# Patient Record
Sex: MALE | Race: WHITE
[De-identification: names, ages, dates, MRNs, and addresses within clinical notes are randomized per-mention and may not be internally consistent; named-entity substitution may affect disease eponyms.]

---

## 2018-04-04 ENCOUNTER — HOSPITAL ENCOUNTER (EMERGENCY)
Dept: HOSPITAL 45 - C.EDB | Age: 63
Discharge: HOME | End: 2018-04-04
Payer: COMMERCIAL

## 2018-04-04 VITALS
WEIGHT: 191.14 LBS | BODY MASS INDEX: 30 KG/M2 | HEIGHT: 67.01 IN | WEIGHT: 191.14 LBS | BODY MASS INDEX: 30 KG/M2 | HEIGHT: 67.01 IN

## 2018-04-04 VITALS — OXYGEN SATURATION: 100 % | HEART RATE: 76 BPM

## 2018-04-04 VITALS — TEMPERATURE: 98.24 F

## 2018-04-04 VITALS — DIASTOLIC BLOOD PRESSURE: 87 MMHG | SYSTOLIC BLOOD PRESSURE: 169 MMHG

## 2018-04-04 DIAGNOSIS — Z85.118: ICD-10-CM

## 2018-04-04 DIAGNOSIS — Z98.890: ICD-10-CM

## 2018-04-04 DIAGNOSIS — Z83.3: ICD-10-CM

## 2018-04-04 DIAGNOSIS — Z88.8: ICD-10-CM

## 2018-04-04 DIAGNOSIS — K29.70: Primary | ICD-10-CM

## 2018-04-04 DIAGNOSIS — I10: ICD-10-CM

## 2018-04-04 DIAGNOSIS — E78.5: ICD-10-CM

## 2018-04-04 DIAGNOSIS — I73.9: ICD-10-CM

## 2018-04-04 DIAGNOSIS — K21.9: ICD-10-CM

## 2018-04-04 DIAGNOSIS — Z87.891: ICD-10-CM

## 2018-04-04 DIAGNOSIS — Z79.82: ICD-10-CM

## 2018-04-04 DIAGNOSIS — Z79.899: ICD-10-CM

## 2018-04-04 DIAGNOSIS — Z79.02: ICD-10-CM

## 2018-04-04 LAB
ALBUMIN SERPL-MCNC: 3.9 GM/DL (ref 3.4–5)
ALP SERPL-CCNC: 81 U/L (ref 45–117)
ALT SERPL-CCNC: 30 U/L (ref 12–78)
AST SERPL-CCNC: 18 U/L (ref 15–37)
BASOPHILS # BLD: 0.03 K/UL (ref 0–0.2)
BASOPHILS NFR BLD: 0.3 %
BUN SERPL-MCNC: 9 MG/DL (ref 7–18)
CALCIUM SERPL-MCNC: 8.8 MG/DL (ref 8.5–10.1)
CO2 SERPL-SCNC: 26 MMOL/L (ref 21–32)
CREAT SERPL-MCNC: 0.84 MG/DL (ref 0.6–1.4)
EOS ABS #: 0.14 K/UL (ref 0–0.5)
EOSINOPHIL NFR BLD AUTO: 239 K/UL (ref 130–400)
GLUCOSE SERPL-MCNC: 105 MG/DL (ref 70–99)
HCT VFR BLD CALC: 41.3 % (ref 42–52)
HGB BLD-MCNC: 14.1 G/DL (ref 14–18)
IG#: 0.02 K/UL (ref 0–0.02)
IMM GRANULOCYTES NFR BLD AUTO: 18.1 %
LIPASE: 202 U/L (ref 73–393)
LYMPHOCYTES # BLD: 1.91 K/UL (ref 1.2–3.4)
MCH RBC QN AUTO: 31.1 PG (ref 25–34)
MCHC RBC AUTO-ENTMCNC: 34.1 G/DL (ref 32–36)
MCV RBC AUTO: 91 FL (ref 80–100)
MONO ABS #: 1.06 K/UL (ref 0.11–0.59)
MONOCYTES NFR BLD: 10 %
NEUT ABS #: 7.39 K/UL (ref 1.4–6.5)
NEUTROPHILS # BLD AUTO: 1.3 %
NEUTROPHILS NFR BLD AUTO: 70.1 %
PMV BLD AUTO: 9.5 FL (ref 7.4–10.4)
POTASSIUM SERPL-SCNC: 3.7 MMOL/L (ref 3.5–5.1)
PROT SERPL-MCNC: 7.3 GM/DL (ref 6.4–8.2)
RED CELL DISTRIBUTION WIDTH CV: 12.6 % (ref 11.5–14.5)
RED CELL DISTRIBUTION WIDTH SD: 41.9 FL (ref 36.4–46.3)
SODIUM SERPL-SCNC: 138 MMOL/L (ref 136–145)
WBC # BLD AUTO: 10.55 K/UL (ref 4.8–10.8)

## 2018-04-04 NOTE — EMERGENCY ROOM VISIT NOTE
History


Report prepared by Apolinar:  Venancio Silveira


Under the Supervision of:  Dr. Armond Darby M.D.


First contact with patient:  17:28


Chief Complaint:  ABDOMINAL PAIN


Stated Complaint:  ABDOMINAL PAIN





History of Present Illness


The patient is a 62 year old  male with a past medical history of 

hyperlipidemia, hypertension, acid reflux, and PAD who presents to the ED with 

a cc of intermittent, dull, abdominal pain beginning last night. Negative for 

urinary symptoms, nausea, and vomiting. The patient states that he woke up this 

morning and had a bowel movement. He notes that his pain feels like a "pressure 

from gas." He reports that he took three Gaviscon with no relief of his 

symptoms as well as a Tylenol with mild relief. The patient states that he also 

takes daily Plavix, aspirin, and medications for his acid reflux and 

hypertension. He notes that he also has a stent placed in his leg, resolved 

atrial fibrillation, and has had lung surgery in the past. He reports that has 

not traveled recently and has not experienced any recent trauma.





   Source of History:  patient


   Onset:  last night


   Position:  abdomen


   Quality:  dull, other ("pressure from gas")


   Timing:  intermittent


   Modifying Factors (Relieving):  other (tylenol)


   Associated Symptoms:  No nausea, No vomiting, No urinary symptoms





Review of Systems


See HPI for pertinent positives and negatives.  A total of ten systems were 

reviewed and were otherwise negative.





Past Medical & Surgical


Medical Problems:


(1) Acid reflux


(2) Afib


(3) BPH (benign prostatic hyperplasia)


(4) GERD (gastroesophageal reflux disease)


(5) Hyperlipidemia


(6) Hypertension


(7) Lung cancer, upper lobe


(8) PAD (peripheral artery disease)


(9) Stenosis of lower extremity artery








Family History





Cancer


Diabetes mellitus





Social History


Smoking Status:  Former Smoker


Alcohol Use:  none


Marital Status:  


Housing Status:  lives with family


Occupation Status:  retired





Current/Historical Medications


Scheduled


Aspirin Enteric Coated (Ecotrin Or Generic *), 81 MG PO QAM


Atorvastatin (Lipitor), 80 MG PO HS


Clopidogrel (Plavix), 75 MG PO QAM


Lisinopril (Prinivil), 5 MG PO DAILY


Pantoprazole (Protonix), 40 MG PO BID


Ranitidine (Zantac), 300 MG PO HS





Allergies


Coded Allergies:  


     Famotidine (Verified  Allergy, Intermediate, RASH, 4/4/18)


     Cilostazol (Verified  Adverse Reaction, Mild, pt unaware of this allergy- 

per records causes headaches , 4/4/18)





Physical Exam


Vital Signs











  Date Time  Temp Pulse Resp B/P (MAP) Pulse Ox O2 Delivery O2 Flow Rate FiO2


 


4/4/18 19:11    146/92    


 


4/4/18 18:49  68 17  98 Room Air  


 


4/4/18 18:34  60 22  99   


 


4/4/18 18:19  68 12  97   


 


4/4/18 18:13  63      


 


4/4/18 18:09    173/95    


 


4/4/18 17:24 36.8 83 17 159/97 97 Room Air  











Physical Exam


GENERAL: Awake, alert, well-appearing, NAD


HENT: Normocephalic, atraumatic.


EYES: Normal conjunctiva. Sclera non-icteric.


NECK: Supple. No nuchal rigidity. FROM.


RESPIRATORY: CTAB, no rhonchi, wheezing, crackles


CARDIAC: RRR, no MRG


ABDOMEN: Soft, NTND, BS+, negative obturators, negative psoas, negative Crocker'

s.


MSK: No chest wall TTP, no LE edema, no CVA TTP.


NEURO: GCS 15, CN 2-12 intact, moves all 4s on command


SKIN: No rash or jaundice noted.





Medical Decision & Procedures


ER Provider


Diagnostic Interpretation:


Radiology results as stated below per my review and radiologist interpretation: 





KUB





FINDINGS: 2 AP supine abdominal radiographs are correlated with abdominal CT


dated 2/12/2007. There is a nonobstructed abdominal bowel gas pattern. Mild


colonic fecal retention is noted. No evidence of intraperitoneal free air is


seen on these supine views. There are no abnormal abdominal calcifications. The


skeletal structures are osteopenic. Lumbosacral spondylosis is observed.





IMPRESSION: Nonobstructed abdominal bowel gas pattern.





Electronically signed by:  Alfonzo Lake M.D.


4/4/2018 7:24 PM





Laboratory Results


4/4/18 18:04








Red Blood Count 4.54, Mean Corpuscular Volume 91.0, Mean Corpuscular Hemoglobin 

31.1, Mean Corpuscular Hemoglobin Concent 34.1, Mean Platelet Volume 9.5, 

Neutrophils (%) (Auto) 70.1, Lymphocytes (%) (Auto) 18.1, Monocytes (%) (Auto) 

10.0, Eosinophils (%) (Auto) 1.3, Basophils (%) (Auto) 0.3, Neutrophils # (Auto

) 7.39, Lymphocytes # (Auto) 1.91, Monocytes # (Auto) 1.06, Eosinophils # (Auto

) 0.14, Basophils # (Auto) 0.03





4/4/18 18:04

















Test


  4/4/18


18:04 4/4/18


18:05 4/4/18


18:07


 


White Blood Count


  10.55 K/uL


(4.8-10.8) 


  


 


 


Red Blood Count


  4.54 M/uL


(4.7-6.1) 


  


 


 


Hemoglobin


  14.1 g/dL


(14.0-18.0) 


  


 


 


Hematocrit 41.3 % (42-52)   


 


Mean Corpuscular Volume


  91.0 fL


() 


  


 


 


Mean Corpuscular Hemoglobin


  31.1 pg


(25-34) 


  


 


 


Mean Corpuscular Hemoglobin


Concent 34.1 g/dl


(32-36) 


  


 


 


Platelet Count


  239 K/uL


(130-400) 


  


 


 


Mean Platelet Volume


  9.5 fL


(7.4-10.4) 


  


 


 


Neutrophils (%) (Auto) 70.1 %   


 


Lymphocytes (%) (Auto) 18.1 %   


 


Monocytes (%) (Auto) 10.0 %   


 


Eosinophils (%) (Auto) 1.3 %   


 


Basophils (%) (Auto) 0.3 %   


 


Neutrophils # (Auto)


  7.39 K/uL


(1.4-6.5) 


  


 


 


Lymphocytes # (Auto)


  1.91 K/uL


(1.2-3.4) 


  


 


 


Monocytes # (Auto)


  1.06 K/uL


(0.11-0.59) 


  


 


 


Eosinophils # (Auto)


  0.14 K/uL


(0-0.5) 


  


 


 


Basophils # (Auto)


  0.03 K/uL


(0-0.2) 


  


 


 


RDW Standard Deviation


  41.9 fL


(36.4-46.3) 


  


 


 


RDW Coefficient of Variation


  12.6 %


(11.5-14.5) 


  


 


 


Immature Granulocyte % (Auto) 0.2 %   


 


Immature Granulocyte # (Auto)


  0.02 K/uL


(0.00-0.02) 


  


 


 


Anion Gap


  6.0 mmol/L


(3-11) 


  


 


 


Est Creatinine Clear Calc


Drug Dose 93.4 ml/min 


  


  


 


 


Estimated GFR (


American) 107.2 


  


  


 


 


Estimated GFR (Non-


American 92.5 


  


  


 


 


BUN/Creatinine Ratio 11.1 (10-20)   


 


Calcium Level


  8.8 mg/dl


(8.5-10.1) 


  


 


 


Total Bilirubin


  0.6 mg/dl


(0.2-1) 


  


 


 


Direct Bilirubin


  0.1 mg/dl


(0-0.2) 


  


 


 


Aspartate Amino Transf


(AST/SGOT) 18 U/L (15-37) 


  


  


 


 


Alanine Aminotransferase


(ALT/SGPT) 30 U/L (12-78) 


  


  


 


 


Alkaline Phosphatase


  81 U/L


() 


  


 


 


Total Protein


  7.3 gm/dl


(6.4-8.2) 


  


 


 


Albumin


  3.9 gm/dl


(3.4-5.0) 


  


 


 


Lipase


  202 U/L


() 


  


 


 


Urine Color  YELLOW  


 


Urine Appearance  CLEAR (CLEAR)  


 


Urine pH  6.5 (4.5-7.5)  


 


Urine Specific Gravity


  


  1.005


(1.000-1.030) 


 


 


Urine Protein  NEG (NEG)  


 


Urine Glucose (UA)  NEG (NEG)  


 


Urine Ketones  NEG (NEG)  


 


Urine Occult Blood  NEG (NEG)  


 


Urine Nitrite  NEG (NEG)  


 


Urine Bilirubin  NEG (NEG)  


 


Urine Urobilinogen  NEG (NEG)  


 


Urine Leukocyte Esterase  NEG (NEG)  


 


Bedside Troponin I


  


  


  < 0.030 ng/ml


(0-0.045)





Laboratory results reviewed by me





Medications Administered











 Medications


  (Trade)  Dose


 Ordered  Sig/Maneul


 Route  Start Time


 Stop Time Status Last Admin


Dose Admin


 


 Ondansetron HCl


  (Zofran Inj)  4 mg  NOW  STAT


 IV  4/4/18 17:47


 4/4/18 17:49 DC 4/4/18 18:10


4 MG


 


 Al Hydroxide/Mg


 Hydroxide


  (Maalox Susp)  30 ml  STK-MED ONCE


 .ROUTE  4/4/18 17:51


 4/4/18 17:52 DC 4/4/18 18:10


30 ML


 


 Lidocaine HCl


  (Viscous


 Lidocaine 2% Soln)  20 ml  STK-MED ONCE


 .ROUTE  4/4/18 17:51


 4/4/18 17:52 DC 4/4/18 18:11


20 ML











ECG Per My Interpretation


Indication:  abdominal pain


Rate (beats per minute):  60


Rhythm:  normal sinus


Findings:  T-wave inversion (lead 3), other (Normal intervals, normal axis, AVF 

present)


Comparison ECG Date:  12/12/2016


Change:


T wave inversions are old compared to prior.





ED Course


1731: The patient was evaluated in room B11. A complete history and physical 

exam was performed.





2136: I reevaluated the patient. Discussed results and discharge instructions: 

He verbalized understanding and agreement. The patient is ready for discharge.





Medical Decision


Nursing notes reviewed. Ancillary studies and prior records reviewed. 





The patient is a 62 year old  male with a past medical history of 

hyperlipidemia, hypertension, acid reflux, and PAD on ASA and Plavix who 

presents to the ED with a cc of intermittent, dull, abdominal pain beginning 

last night. Negative for urinary symptoms, nausea, and vomiting. 





Differential diagnosis:


Etiologies such as appendicitis, diverticulitis, PUD, biliary pathology, UTI, 

pancreatitis, obstruction, mesenteric ischemia, aortic pathology, infections, 

inflammatory bowel disease, renal colic, as well as others were entertained. 





Patient was seen and evaluated at the bedside.  Patient was complaining of some 

mild abdominal discomfort.  He described it as intermittent and in the upper 

abdomen.  Patient did try drinking some soda water and tried some Gaviscon 

without much relief.  The patient denies any nausea vomiting.  Patient did have 

recent bowel movement this morning.  Patient does have a prior history of PAD 

status post stent placement and is on aspirin and Plavix.  Patient denies any 

chest pains or shortness of breath.





Patient did have blood work completed along with an EKG, troponin, and plain 

films.  Patient was also given medications for symptom and a control.  Patient'

s EKG did show TW I in the inferior leads but these appear old and are not 

acute.  Troponin negative.  Less likely ACS or atypical chest pains.  His other 

blood work is fairly unremarkable.  Patient does have a KUB with a 

nonobstructive bowel gas pattern.





On reassessment patient was feeling improved.  Patient was told of his results 

and was told to continue over-the-counter treatment to follow-up with his 

primary care as needed.  I do not believe he requires further imaging as the 

patient is well-appearing and feeling well.  Patient was given strict follow-up

, discharge, and return precautions.  All questions were answered.  Patient was 

deemed suitable for outpatient follow-up at this time.  Patient agreed with the 

plan of care and was safely discharged home.





Medication Reconcilliation


Current Medication List:  was personally reviewed by me





Blood Pressure Screening


Patient's blood pressure:  Elevated blood pressure


Blood pressure disposition:  Referred to PCP





Impression





 Primary Impression:  


 Gastritis





Scribe Attestation


The scribe's documentation has been prepared under my direction and personally 

reviewed by me in its entirety. I confirm that the note above accurately 

reflects all work, treatment, procedures, and medical decision making performed 

by me.





Departure Information


Dispostion


Home / Self-Care





Referrals


Melvin Echeverria M.D. (PCP)





Forms


Call Back Authorization, HOME CARE DOCUMENTATION FORM,                         

                                       IMPORTANT VISIT INFORMATION





Patient Instructions


ED Gastritis, My Bimal Angulo [facility]





Additional Instructions





Please return to the emergency department if you have worsening or recurrent 

symptoms not amenable to at-home treatment.  Please call for a follow-up 

appointment with her primary care physician.  Please take your medications as 

prescribed.  If you have other concerns and/or complaints please feel free to 

also call your primary care physician's office or return the ED for further 

evaluation, management, and treatment.





You may take tylenol 1000 mg every 6 hours as needed for pain. tylenol 

separately or at the same time. 





Take your medications as prescribed. 





You have been examined and treated today on an emergency basis only. This is 

not a substitute for, or an effort to provide, complete comprehensive medical 

care. It is impossible to recognize and treat all injuries or illnesses in a 

single emergency department visit. It is therefore important that you follow up 

closely with [facility], your PCP, and/or your specialist(s). 

Call as soon as possible for an appointment.





Thank you for your time and consideration. I look forward to speaking with you 

again soon. Please don't hesitate to call us if you have any questions.





Problem Qualifiers








 Primary Impression:  


 Gastritis


 Gastritis type:  unspecified gastritis  Chronicity:  unspecified  Gastritis 

bleeding:  presence of bleeding unspecified  Qualified Codes:  K29.70 - 

Gastritis, unspecified, without bleeding

## 2018-04-04 NOTE — DIAGNOSTIC IMAGING REPORT
KUB



CLINICAL HISTORY: Generalized abdominal pain.



FINDINGS: 2 AP supine abdominal radiographs are correlated with abdominal CT

dated 2/12/2007. There is a nonobstructed abdominal bowel gas pattern. Mild

colonic fecal retention is noted. No evidence of intraperitoneal free air is

seen on these supine views. There are no abnormal abdominal calcifications. The

skeletal structures are osteopenic. Lumbosacral spondylosis is observed.



IMPRESSION: Nonobstructed abdominal bowel gas pattern.







Electronically signed by:  Alfonzo Lake M.D.

4/4/2018 7:24 PM



Dictated Date/Time:  4/4/2018 7:23 PM

## 2018-04-26 ENCOUNTER — HOSPITAL ENCOUNTER (EMERGENCY)
Dept: HOSPITAL 45 - C.EDB | Age: 63
Discharge: HOME | End: 2018-04-26
Payer: COMMERCIAL

## 2018-04-26 VITALS
BODY MASS INDEX: 27.85 KG/M2 | HEIGHT: 67.01 IN | HEIGHT: 67.01 IN | WEIGHT: 177.47 LBS | WEIGHT: 177.47 LBS | BODY MASS INDEX: 27.85 KG/M2

## 2018-04-26 VITALS — TEMPERATURE: 98.06 F

## 2018-04-26 VITALS — DIASTOLIC BLOOD PRESSURE: 94 MMHG | SYSTOLIC BLOOD PRESSURE: 170 MMHG | HEART RATE: 76 BPM | OXYGEN SATURATION: 96 %

## 2018-04-26 DIAGNOSIS — E78.5: ICD-10-CM

## 2018-04-26 DIAGNOSIS — Z83.3: ICD-10-CM

## 2018-04-26 DIAGNOSIS — Z79.899: ICD-10-CM

## 2018-04-26 DIAGNOSIS — I10: ICD-10-CM

## 2018-04-26 DIAGNOSIS — K21.9: ICD-10-CM

## 2018-04-26 DIAGNOSIS — Z79.82: ICD-10-CM

## 2018-04-26 DIAGNOSIS — Z79.02: ICD-10-CM

## 2018-04-26 DIAGNOSIS — I48.91: ICD-10-CM

## 2018-04-26 DIAGNOSIS — Z88.8: ICD-10-CM

## 2018-04-26 DIAGNOSIS — I73.9: ICD-10-CM

## 2018-04-26 DIAGNOSIS — R19.7: Primary | ICD-10-CM

## 2018-04-26 DIAGNOSIS — Z85.118: ICD-10-CM

## 2018-04-26 DIAGNOSIS — E87.6: ICD-10-CM

## 2018-04-26 LAB
ALBUMIN SERPL-MCNC: 3.4 GM/DL (ref 3.4–5)
ALP SERPL-CCNC: 75 U/L (ref 45–117)
ALT SERPL-CCNC: 47 U/L (ref 12–78)
AST SERPL-CCNC: 28 U/L (ref 15–37)
BASOPHILS # BLD: 0.03 K/UL (ref 0–0.2)
BASOPHILS NFR BLD: 0.5 %
BUN SERPL-MCNC: 7 MG/DL (ref 7–18)
CALCIUM SERPL-MCNC: 8.3 MG/DL (ref 8.5–10.1)
CO2 SERPL-SCNC: 25 MMOL/L (ref 21–32)
CREAT SERPL-MCNC: 0.84 MG/DL (ref 0.6–1.4)
EOS ABS #: 0.06 K/UL (ref 0–0.5)
EOSINOPHIL NFR BLD AUTO: 204 K/UL (ref 130–400)
GLUCOSE SERPL-MCNC: 112 MG/DL (ref 70–99)
HCT VFR BLD CALC: 39.8 % (ref 42–52)
HGB BLD-MCNC: 13.8 G/DL (ref 14–18)
IG#: 0.01 K/UL (ref 0–0.02)
IMM GRANULOCYTES NFR BLD AUTO: 23.9 %
LIPASE: 194 U/L (ref 73–393)
LYMPHOCYTES # BLD: 1.4 K/UL (ref 1.2–3.4)
MCH RBC QN AUTO: 30.7 PG (ref 25–34)
MCHC RBC AUTO-ENTMCNC: 34.7 G/DL (ref 32–36)
MCV RBC AUTO: 88.4 FL (ref 80–100)
MONO ABS #: 0.63 K/UL (ref 0.11–0.59)
MONOCYTES NFR BLD: 10.7 %
NEUT ABS #: 3.74 K/UL (ref 1.4–6.5)
NEUTROPHILS # BLD AUTO: 1 %
NEUTROPHILS NFR BLD AUTO: 63.7 %
PMV BLD AUTO: 9.9 FL (ref 7.4–10.4)
POTASSIUM SERPL-SCNC: 3.3 MMOL/L (ref 3.5–5.1)
PROT SERPL-MCNC: 7.8 GM/DL (ref 6.4–8.2)
RED CELL DISTRIBUTION WIDTH CV: 12.6 % (ref 11.5–14.5)
RED CELL DISTRIBUTION WIDTH SD: 40.4 FL (ref 36.4–46.3)
SODIUM SERPL-SCNC: 136 MMOL/L (ref 136–145)
WBC # BLD AUTO: 5.87 K/UL (ref 4.8–10.8)

## 2018-04-26 NOTE — EMERGENCY ROOM VISIT NOTE
History


Report prepared by Apolinar:  Robbin Haley


Under the Supervision of:  Dr. Navarro Ruiz D.O.


First contact with patient:  14:01


Chief Complaint:  DIARRHEA


Stated Complaint:  COUGH,CHEST CONGESTION,CANT EAT,WEAKNESS





History of Present Illness


The patient is a 62 year old male who presents to the Emergency Room with 

complaints of persistent diarrhea that he has been experiencing for the past 4 

days. The patient states that his symptoms began on the 18th, 8 days ago, when 

he developed a cough that was producing "yellowish mucous." He visited with his 

primary care office who prescribed him Augmentin. He was also give Nystatin 

"Swish and Swallow" for Thrush. Since starting the Augmentin he has developed 

the diarrhea. He also complains that he is nauseous, but is not vomiting. He 

has no appetite to eat. He denies any abdominal pain. The patient did add that 

he had a fever of 100.9 degrees 5 days ago. He denies any recent travel or 

history of C. difficile.





   Source of History:  patient


   Onset:  4 days ago


   Position:  abdomen


   Quality:  other (diarrhea )


   Timing:  other (persistent)


   Associated Symptoms:  + nausea, No vomiting, No abdominal pain





Review of Systems


See HPI for pertinent positives & negatives. A total of 10 systems reviewed and 

were otherwise negative.





Past Medical & Surgical


Medical Problems:


(1) Acid reflux


(2) Afib


(3) BPH (benign prostatic hyperplasia)


(4) GERD (gastroesophageal reflux disease)


(5) Hyperlipidemia


(6) Hypertension


(7) Lung cancer, upper lobe


(8) PAD (peripheral artery disease)


(9) Stenosis of lower extremity artery








Family History





Cancer


Diabetes mellitus





Social History


Smoking Status:  Never Smoker


Alcohol Use:  none


Marital Status:  


Housing Status:  lives with family


Occupation Status:  retired





Current/Historical Medications


Scheduled


Aluminum Hydroxide-Mag Trisil (Gaviscon), 1 TAB PO AS DIRECTED


Aspirin (Aspirin Ec), 81 MG PO DAILY


Atorvastatin (Lipitor), 80 MG PO HS


Clopidogrel (Plavix), 75 MG PO QAM


Lisinopril (Prinivil), 5 MG PO DAILY


Pantoprazole (Protonix), 40 MG PO BID


Ranitidine (Zantac), 300 MG PO HS


Sucralfate (Carafate), 10 ML PO QID





Allergies


Coded Allergies:  


     Famotidine (Verified  Allergy, Intermediate, RASH, 4/26/18)


     Cilostazol (Verified  Adverse Reaction, Mild, pt unaware of this allergy- 

per records causes headaches , 4/26/18)





Physical Exam


Vital Signs











  Date Time  Temp Pulse Resp B/P (MAP) Pulse Ox O2 Delivery O2 Flow Rate FiO2


 


4/26/18 16:25  76 18 170/94 96   


 


4/26/18 15:43  74 18 149/85 97 Room Air  


 


4/26/18 13:47 36.7 95 20 143/86 95 Room Air  











Physical Exam


GENERAL: Sitting up in bed, alert, well appearing, well nourished, no distress, 

non-toxic 


EYE EXAM: normal conjunctiva. 


OROPHARYNX: no exudate, no erythema, lips, buccal mucosa, and tongue normal and 

mucous membranes are moist


NECK: supple, no nuchal rigidity, no adenopathy, non-tender


LUNGS: Clear to auscultation. Normal chest wall mechanics


HEART: no murmurs, S1 normal and S2 normal 


ABDOMEN: abdomen soft, non-tender, normo-active bowel sounds, no masses, no 

rebound or guarding. 


BACK: Back is symmetrical on inspection and there is no deformity, no midline 

tenderness, no CVA tenderness. 


SKIN: no rashes and no bruising 


UPPER EXTREMITIES: upper extremities are grossly normal. 


LOWER EXTREMITIES: No pitting edema. 


NEURO EXAM: Normal sensorium, cranial nerves II-XII grossly intact, normal 

speech,  no gross weakness of arms, no gross weakness of legs.





Medical Decision & Procedures


Laboratory Results


4/26/18 14:27








Red Blood Count 4.50, Mean Corpuscular Volume 88.4, Mean Corpuscular Hemoglobin 

30.7, Mean Corpuscular Hemoglobin Concent 34.7, Mean Platelet Volume 9.9, 

Neutrophils (%) (Auto) 63.7, Lymphocytes (%) (Auto) 23.9, Monocytes (%) (Auto) 

10.7, Eosinophils (%) (Auto) 1.0, Basophils (%) (Auto) 0.5, Neutrophils # (Auto

) 3.74, Lymphocytes # (Auto) 1.40, Monocytes # (Auto) 0.63, Eosinophils # (Auto

) 0.06, Basophils # (Auto) 0.03





4/26/18 14:27

















Test


  4/26/18


14:20 4/26/18


14:27


 


Urine Color YELLOW  


 


Urine Appearance CLEAR (CLEAR)  


 


Urine pH 6.5 (4.5-7.5)  


 


Urine Specific Gravity


  1.009


(1.000-1.030) 


 


 


Urine Protein NEG (NEG)  


 


Urine Glucose (UA) NEG (NEG)  


 


Urine Ketones NEG (NEG)  


 


Urine Occult Blood NEG (NEG)  


 


Urine Nitrite NEG (NEG)  


 


Urine Bilirubin NEG (NEG)  


 


Urine Urobilinogen NEG (NEG)  


 


Urine Leukocyte Esterase NEG (NEG)  


 


Urine WBC (Auto) 1-5 /hpf (0-5)  


 


Urine RBC (Auto) 0-4 /hpf (0-4)  


 


Urine Hyaline Casts (Auto) 0 /lpf (0-5)  


 


Urine Epithelial Cells (Auto) 0-5 /lpf (0-5)  


 


Urine Bacteria (Auto) NEG (NEG)  


 


White Blood Count


  


  5.87 K/uL


(4.8-10.8)


 


Red Blood Count


  


  4.50 M/uL


(4.7-6.1)


 


Hemoglobin


  


  13.8 g/dL


(14.0-18.0)


 


Hematocrit  39.8 % (42-52) 


 


Mean Corpuscular Volume


  


  88.4 fL


()


 


Mean Corpuscular Hemoglobin


  


  30.7 pg


(25-34)


 


Mean Corpuscular Hemoglobin


Concent 


  34.7 g/dl


(32-36)


 


Platelet Count


  


  204 K/uL


(130-400)


 


Mean Platelet Volume


  


  9.9 fL


(7.4-10.4)


 


Neutrophils (%) (Auto)  63.7 % 


 


Lymphocytes (%) (Auto)  23.9 % 


 


Monocytes (%) (Auto)  10.7 % 


 


Eosinophils (%) (Auto)  1.0 % 


 


Basophils (%) (Auto)  0.5 % 


 


Neutrophils # (Auto)


  


  3.74 K/uL


(1.4-6.5)


 


Lymphocytes # (Auto)


  


  1.40 K/uL


(1.2-3.4)


 


Monocytes # (Auto)


  


  0.63 K/uL


(0.11-0.59)


 


Eosinophils # (Auto)


  


  0.06 K/uL


(0-0.5)


 


Basophils # (Auto)


  


  0.03 K/uL


(0-0.2)


 


RDW Standard Deviation


  


  40.4 fL


(36.4-46.3)


 


RDW Coefficient of Variation


  


  12.6 %


(11.5-14.5)


 


Immature Granulocyte % (Auto)  0.2 % 


 


Immature Granulocyte # (Auto)


  


  0.01 K/uL


(0.00-0.02)


 


Anion Gap


  


  7.0 mmol/L


(3-11)


 


Est Creatinine Clear Calc


Drug Dose 


  90.3 ml/min 


 


 


Estimated GFR (


American) 


  107.2 


 


 


Estimated GFR (Non-


American 


  92.5 


 


 


BUN/Creatinine Ratio  7.8 (10-20) 


 


Calcium Level


  


  8.3 mg/dl


(8.5-10.1)


 


Total Bilirubin


  


  0.5 mg/dl


(0.2-1)


 


Direct Bilirubin


  


  0.2 mg/dl


(0-0.2)


 


Aspartate Amino Transf


(AST/SGOT) 


  28 U/L (15-37) 


 


 


Alanine Aminotransferase


(ALT/SGPT) 


  47 U/L (12-78) 


 


 


Alkaline Phosphatase


  


  75 U/L


()


 


Total Protein


  


  7.8 gm/dl


(6.4-8.2)


 


Albumin


  


  3.4 gm/dl


(3.4-5.0)


 


Lipase


  


  194 U/L


()














 Date/Time


Source Procedure


Growth Status


 


 


 4/26/18 14:20


Stool C.difficile Toxin B Gene (PCR) - Final


No C. difficile toxin B gene detected Complete





Laboratory results per my review.





Medications Administered











 Medications


  (Trade)  Dose


 Ordered  Sig/Manuel


 Route  Start Time


 Stop Time Status Last Admin


Dose Admin


 


 Sodium Chloride  1,000 ml @ 


 999 mls/hr  Q1H1M STAT


 IV  4/26/18 14:33


 4/26/18 15:33 DC 4/26/18 14:39


999 MLS/HR


 


 Potassium Chloride


  (Klor-Con M10)  40 meq  STK-MED ONCE


 .ROUTE  4/26/18 16:09


 4/26/18 16:10 DC 4/26/18 16:21


40 MEQ











ED Course


ED COURSE: 


Vital signs were reviewed and showed hypertensive blood pressure. 


The patients medical record was reviewed


The above diagnostic studies were performed and reviewed.


ED treatments and interventions as stated above. 





1404: The patient was evaluated in room B11B. A complete history and physical 

examination was performed.





1433: Ordered Zofran 4 mg IV, Sodium Chloride 1000 mL @ 999 mL/hr IV. 





1547: Potassium Chloride 40 meq PO. 





1558: Upon reevaluation, the patient is resting in bed.I discussed my findings 

with the patient and he understands and agrees with the treatment plan.   


Based on the patients age, coexisting illnesses, exam and lab findings the 

decision to treat as an outpatient was made.


The patient remained stable while under my care.


The patient appeared well at the time of discharge.





Medical Decision


Differential diagnoses includes but is not limited to gastritis, peptic ulcer 

disease, GERD, gallbladder disease, pancreatitis, small bowel obstruction, 

acute coronary syndrome, pericarditis, ischemic bowel, irritable bowel disease, 

irritable bowel syndrome, appendicitis, diverticulitis, malignancy, hernia, 

urinary tract infection, torsion, perforation, trauma, infectious. 





Patient is a 62-year-old male who presents to ER for cough associated with a 

yellow and green productive sputum which is been present for the past week and 

now improving as he was placed on Augmentin.  He presents today because he has 

had 4 days worth of diarrhea which is been fairly persistent.  He notes he does 

not want to eat but has no abdominal pain or vomiting.  He is able to tolerate 

fluids and food when he does eat.  Vitals are unremarkable.  CBC along with BMP

, LFTs, bilirubin lipase shows a mild hypokalemia.  UA was negative.  C. 

difficile was negative.  Patient was updated at bedside.  I favor the diarrhea 

secondary to the Augmentin.  Patient was discharged follow-up with PCP as an 

outpatient. Discussed with Pt concerning signs and symptoms to watch out for. 

Pt was instructed to follow up with their PCP and discussed with the patient 

their option to return to the ED at anytime for persistent or worsening 

symptoms. The appropriate anticipatory guidance and out-patient management, 

including indications for return to the emergency department, were explained at 

length to the patient and understood.





Medication Reconcilliation


Current Medication List:  was personally reviewed by me





Blood Pressure Screening


Patient's blood pressure:  Elevated blood pressure


Blood pressure disposition:  Elevated BP felt to be situational





Impression





 Primary Impression:  


 Diarrhea


 Additional Impression:  


 Hypokalemia





Scribe Attestation


The scribe's documentation has been prepared under my direction and personally 

reviewed by me in its entirety. I confirm that the note above accurately 

reflects all work, treatment, procedures, and medical decision making performed 

by me.





Departure Information


Dispostion


Home / Self-Care





Referrals


Melvin Echeverria M.D. (PCP)





Forms


HOME CARE DOCUMENTATION FORM,                                                 

               IMPORTANT VISIT INFORMATION, WORK / SCHOOL INSTRUCTIONS





Patient Instructions


My [facility]





Additional Instructions





Please follow up with your primary care doctor with in the next 24 hours.  Any 

worsening of your symptoms, please return to the ED immediately. This includes 

any fevers greater than 100.4, worsening pain, chest pain, shortness breath, 

persistent nausea, vomiting, unable to eat or drink, or any other concerning 

signs or symptoms from your standpoint.





Please continue to remain as hydrated as possible.





Problem Qualifiers








 Primary Impression:  


 Diarrhea


 Diarrhea type:  unspecified type  Qualified Codes:  R19.7 - Diarrhea, 

unspecified

## 2018-05-17 ENCOUNTER — HOSPITAL ENCOUNTER (OUTPATIENT)
Dept: HOSPITAL 45 - C.GI | Age: 63
Discharge: HOME | End: 2018-05-17
Attending: INTERNAL MEDICINE
Payer: COMMERCIAL

## 2018-05-17 VITALS
WEIGHT: 187.39 LBS | HEIGHT: 67.52 IN | HEIGHT: 67.52 IN | BODY MASS INDEX: 28.73 KG/M2 | WEIGHT: 187.39 LBS | BODY MASS INDEX: 28.73 KG/M2

## 2018-05-17 VITALS — OXYGEN SATURATION: 100 % | SYSTOLIC BLOOD PRESSURE: 156 MMHG | HEART RATE: 72 BPM | DIASTOLIC BLOOD PRESSURE: 86 MMHG

## 2018-05-17 DIAGNOSIS — E78.00: ICD-10-CM

## 2018-05-17 DIAGNOSIS — R10.13: Primary | ICD-10-CM

## 2018-05-17 DIAGNOSIS — Z85.118: ICD-10-CM

## 2018-05-17 DIAGNOSIS — Z79.02: ICD-10-CM

## 2018-05-17 DIAGNOSIS — Z79.82: ICD-10-CM

## 2018-05-17 DIAGNOSIS — K21.0: ICD-10-CM

## 2018-05-17 DIAGNOSIS — Z88.8: ICD-10-CM

## 2018-05-17 NOTE — DISCHARGE INSTRUCTIONS
Endoscopy Patient Instructions


Date / Procedure(s) Performed


May 17, 2018.


EGD





Allergy Information


Coded Allergies:  


     Famotidine (Verified  Allergy, Intermediate, RASH, 5/17/18)


     Cilostazol (Verified  Adverse Reaction, Mild, pt unaware of this allergy- 

per records causes headaches , 5/17/18)





Discharge Date / Findings


May 17, 2018.


Biopsies of the distal esophagus





Medication Instructions


Stopped Medication(s):  


ASPIRIN 81MG AND PLAVIX LAST DOSE 5/12/18





Reported Home Medications








 Medications  Dose


 Route/Sig


 Max Daily Dose Days Date Category


 


 Gaviscon


  (Aluminum


 Hydroxide-Mag


 Trisil) 1 Chw Chw  1 Tab


 PO AS DIRECTED


    4/23/18 Reported


 


 Aspirin Ec


  (Aspirin) 81 Mg


 Tab  81 Mg


 PO DAILY


    4/23/18 Reported


 


 Prinivil


  (Lisinopril) 5 Mg


 Tab  5 Mg


 PO DAILY


    4/4/18 Reported


 


 Zantac


  (Ranitidine HCl)


 300 Mg Tab  300 Mg


 PO HS


    8/18/15 Reported


 


 Lipitor


  (Atorvastatin


 Calcium) 80 Mg Tab  80 Mg


 PO HS


    6/9/14 Reported


 


 Protonix


  (Pantoprazole


 Sodium) 40 Mg Tab  40 Mg


 PO BID


    6/9/14 Reported


 


 Plavix


  (Clopidogrel


 Bisulfate) 75 Mg


 Tab  75 Mg


 PO QAM


    12/14/09 Reported





OK to resume all medications today as prescribed





Provider Instructions





Activity Restrictions





-  No exercising or heavy lifting for 24 hours. 


-  Do not drink alcohol the day of the procedure.


-  Do not drive a car or operate machinery until the day after the procedure.


-  Do not make any important decisions or sign important papers in 24 hours 

after the procedure.





Following Day:





-  Return to full activity which may include returning to work/school.





Diet





Start your diet with liquids and light foods (jello, soup, juice, toast).  Then 

eat your usual diet if not nauseated.





Treatment For Common After Affects





For mild abdominal pain, bloating, or excessive gas:





-  Rest


-  Eat lightly


-  Lie on right side





Follow-Up Information


Follow-up with DR DURAN as scheduled





Anesthesia Information





What You Should Know





You have had a procedure that required some medicine to reduce anxiety and 

discomfort. This treatment is called moderate sedation.  


After receiving the treatment, you may be sleepy, but you will be able to 

breathe on your own.  The effects of the treatment may last for several hours.








Follow these instructions along with Activity/Diet recommendations noted above:





*  Do NOT do anything where dizziness or clumsiness would be dangerous.





*  Rest quietly at home today, then you can be up and about tomorrow.





*  Have a responsible person stay with you the rest of today.





*  You may have had an I.V. today.  If so, you may take the dressing off later 

today.





Recommendations


 


Call your doctor if:





*  Trouble breathing 





*  Continuous vomiting for more than 24 hours








*  Temperature above 101 degrees





*  Severe abdominal pain or bloating





*  Pain not relieved by pain medicine ordered





*  There is increased drainage or redness from any incision





*  A large amount of rectal bleeding greater than 2-3 tablespoons. 


   (If you had a polyp/s removed or have hemorrhoids, a small amount of blood -


    from the rectum is to be expected.)





*  You have any unanswered questions or concerns.








IN THE EVENT OF A SERIOUS EMERGENCY, GO TO THE NEAREST EMERGENCY ROOM








       Your discharge instructions were prepared by provider Mike Ramsey.





 Patient Instructions Signature Page














Romel Saldana 











Patient (or Guardian) Signature/Date:____________________________________ I 

have read and understand the instructions given to me by my caregivers.








Caregiver/RN/Doctor Signature/Date:____________________________________











The above-named patient and/or guardian has received patient instructions on 

this date.





























+  Original Patient Signature Page (only) stays with chart.  Please make copy 

for patient.

## 2018-05-17 NOTE — GI REPORT
Patient Name: Romel Saldana

Procedure Date: 5/17/2018 3:07 PM

MRN: [id number]

Account Number: [id number]

YOB: 1955

Admit Type: Outpatient

Age: 62

Gender: Male

Attending MD: Mike Ramsey DO

Procedure:            Upper GI endoscopy

Providers:            Mike Ramsey DO

Referring MD:         Marley Gresham

Indications:          Epigastric abdominal pain, Gastro-esophageal reflux 

                      disease

Medicines:            Monitored Anesthesia Care

Complications:        No immediate complications.

Estimated Blood Loss: Estimated blood loss: none.

Procedure:            Pre-Anesthesia Assessment:

                      - Prior to the procedure, a History and Physical was 

                      performed, and patient medications and allergies were 

                      reviewed. The patient's tolerance of previous 

                      anesthesia was also reviewed. The risks and benefits of 

                      the procedure and the sedation options and risks were 

                      discussed with the patient. All questions were 

                      answered, and informed consent was obtained. Prior 

                      Anticoagulants: The patient last took aspirin 5 days 

                      and Plavix (clopidogrel) 5 days prior to the procedure. 

                      ASA Grade Assessment: III - A patient with severe 

                      systemic disease. After reviewing the risks and 

                      benefits, the patient was deemed in satisfactory 

                      condition to undergo the procedure.

                      After obtaining informed consent, the endoscope was 

                      passed under direct vision. Throughout the procedure, 

                      the patient's blood pressure, pulse, and oxygen 

                      saturations were monitored continuously. The scope was 

                      introduced through the mouth, and advanced to the 

                      second part of duodenum. The upper GI endoscopy was 

                      accomplished without difficulty. The patient tolerated 

                      the procedure well.

Findings:

     The Z-line was irregular and was found 38 cm from the incisors. Biopsies 

     were taken with a cold forceps for histology.

     The stomach was normal.

     The examined duodenum was normal.

Impression:           - Z-line irregular, 38 cm from the incisors. Biopsied.

                      - Normal stomach.

                      - Normal examined duodenum.

Recommendation:       - Resume previous diet.

                      - Continue present medications.

                      - Await pathology results.

                      - Return to primary care physician as previously 

                      scheduled.

Mike Ramsey DO

5/17/2018 3:54:34 PM

This report has been signed electronically.

Note Initiated On: 5/17/2018 3:07 PM

Number of Addenda: 0

     I attest to the content of the Intraoperative Record and orders 

     documented therein, exceptions below



{[id number]}

## 2018-05-17 NOTE — ENDO HISTORY AND PHYSICAL
History & Physical


Date of Service:


May 17, 2018.


Chief Complaint:


ABDOMINAL/EPIGASTRIC PAIN, GERD W/O ESOPHAGITIS


Referring Physician:


DR DURAN


History of Present Illness


63 yo CM who presents for EGD secondary to epigastric abdominal pain and GERD.





Past Medical History


Arthritis, Reflux, High Cholesterol, Other





Past Surgical History


Hx Cardiac Surgery:  Yes (CARDIAC CATH-NO STENT-2008?)


Hx Abdominal Surgery:  No


Hx of Implantable Prosthesis:  No


Hx Post-Op Nausea and Vomiting:  No


Hx Cancer Surgery:  Yes (L LUNG CANCER 01/2016 )


Hx Thoracic Surgery:  No


Hx Orthopedic:  No


Hx Urinary Tract Surgery:  Yes (HYDROCELECTOMY)





Social History


Smoking Status:  Never Smoker


Hx Substance Use:  No


Hx Alcohol Use:  Yes ([facility])





Allergies


Coded Allergies:  


     Famotidine (Verified  Allergy, Intermediate, RASH, 5/17/18)


     Cilostazol (Verified  Adverse Reaction, Mild, pt unaware of this allergy- 

per records causes headaches , 5/17/18)





Current Medications





Reported Home Medications








 Medications  Dose


 Route/Sig


 Max Daily Dose Days Date Category


 


 Gaviscon


  (Aluminum


 Hydroxide-Mag


 Trisil) 1 Chw Chw  1 Tab


 PO AS DIRECTED


    4/23/18 Reported


 


 Aspirin Ec


  (Aspirin) 81 Mg


 Tab  81 Mg


 PO DAILY


    4/23/18 Reported


 


 Prinivil


  (Lisinopril) 5 Mg


 Tab  5 Mg


 PO DAILY


    4/4/18 Reported


 


 Zantac


  (Ranitidine HCl)


 300 Mg Tab  300 Mg


 PO HS


    8/18/15 Reported


 


 Lipitor


  (Atorvastatin


 Calcium) 80 Mg Tab  80 Mg


 PO HS


    6/9/14 Reported


 


 Protonix


  (Pantoprazole


 Sodium) 40 Mg Tab  40 Mg


 PO BID


    6/9/14 Reported


 


 Plavix


  (Clopidogrel


 Bisulfate) 75 Mg


 Tab  75 Mg


 PO QAM


    12/14/09 Reported











Vital Signs


Weight (Kilograms):  85


Height (Feet):  5


Height (Inches):  7.5











  Date Time  Temp Pulse Resp B/P (MAP) Pulse Ox O2 Delivery O2 Flow Rate FiO2


 


5/17/18 14:07 36.5 69 18 163/85 (111) 98 Room Air  











Physical Exam


General Appearance:  WD/WN, no apparent distress


Respiratory/Chest:  


   Auscultation:  breath sounds normal


Cardiovascular:  


   Heart Auscultation:  RRR


Abdomen:  


   Bowel Sounds:  normal


   Inspection & Palpation:  soft, non-distended, no tenderness, guarding & 

rebound





Assessment and Plan


Assessment:


63 yo CM who presents for EGD secondary to epigastric abdominal pain and GERD.








Plan:


Proceed with EGD

## 2018-05-17 NOTE — ANESTHESIOLOGY PROGRESS NOTE
Anesthesia Post Op Note


Date & Time


May 17, 2018 at 15:30





Vital Signs


Pain Intensity:  0





Vital Signs Past 12 Hours








  Date Time  Temp Pulse Resp B/P (MAP) Pulse Ox O2 Delivery O2 Flow Rate FiO2


 


5/17/18 14:07 36.5 69 18 163/85 (111) 98 Room Air  











Notes


Mental Status:  alert / awake / arousable, participated in evaluation


Pt Amnestic to Procedure:  Yes


Nausea / Vomiting:  adequately controlled


Pain:  adequately controlled


Airway Patency, RR, SpO2:  stable & adequate


BP & HR:  stable & adequate


Hydration State:  stable & adequate


Anesthetic Complications:  no major complications apparent